# Patient Record
Sex: MALE | Race: BLACK OR AFRICAN AMERICAN | Employment: OTHER | URBAN - METROPOLITAN AREA
[De-identification: names, ages, dates, MRNs, and addresses within clinical notes are randomized per-mention and may not be internally consistent; named-entity substitution may affect disease eponyms.]

---

## 2019-05-21 ENCOUNTER — OFFICE VISIT (OUTPATIENT)
Dept: FAMILY MEDICINE CLINIC | Age: 59
End: 2019-05-21

## 2019-05-21 VITALS
RESPIRATION RATE: 16 BRPM | DIASTOLIC BLOOD PRESSURE: 64 MMHG | OXYGEN SATURATION: 99 % | WEIGHT: 187.2 LBS | BODY MASS INDEX: 26.8 KG/M2 | HEIGHT: 70 IN | HEART RATE: 46 BPM | SYSTOLIC BLOOD PRESSURE: 140 MMHG | TEMPERATURE: 98.3 F

## 2019-05-21 DIAGNOSIS — I10 ESSENTIAL HYPERTENSION: ICD-10-CM

## 2019-05-21 DIAGNOSIS — M25.511 ACUTE PAIN OF RIGHT SHOULDER: Primary | ICD-10-CM

## 2019-05-21 DIAGNOSIS — E79.0 HYPERURICEMIA: ICD-10-CM

## 2019-05-21 DIAGNOSIS — R07.9 CHEST PAIN, UNSPECIFIED TYPE: ICD-10-CM

## 2019-05-21 DIAGNOSIS — R00.1 BRADYCARDIA: ICD-10-CM

## 2019-05-21 DIAGNOSIS — M62.9 MUSCULOSKELETAL DISORDER INVOLVING UPPER TRAPEZIUS MUSCLE: ICD-10-CM

## 2019-05-21 RX ORDER — IBUPROFEN 800 MG/1
1600 TABLET ORAL DAILY
COMMUNITY

## 2019-05-21 RX ORDER — PHENOL/SODIUM PHENOLATE
20 AEROSOL, SPRAY (ML) MUCOUS MEMBRANE DAILY
Qty: 30 TAB | Refills: 0 | Status: SHIPPED | OUTPATIENT
Start: 2019-05-21

## 2019-05-21 RX ORDER — AMLODIPINE BESYLATE 10 MG/1
10 TABLET ORAL DAILY
COMMUNITY

## 2019-05-21 NOTE — PROGRESS NOTES
1. Have you been to the ER, urgent care clinic since your last visit? Hospitalized since your last visit? No    2. Have you seen or consulted any other health care providers outside of the [address] [place] [address] since your last visit? Include any pap smears or colon screening.  No      Chief Complaint   Patient presents with    New Patient    Chest Pain     x 3 weeks    Back Pain     x 2-3 years     Shoulder Pain     right shoulder pain x 3 weeks and getting worse

## 2019-05-21 NOTE — PROGRESS NOTES
Patient: Adela Kaufman MRN: [id number]  SSN: [id number]    YOB: 1960  Age: 62 y.o. Sex: male      Date of Service: 5/21/2019   Provider: TIMOTHY Johnson   Office Location:   [address] [place] [address] Dr Theo lariosgas, [address]  Office Phone: [phone]  Office Fax: [phone]        REASON FOR VISIT:   Chief Complaint   Patient presents with   [address] [place] [address] Patient    Chest Pain     x 3 weeks    Back Pain     x 2-3 years     Shoulder Pain     right shoulder pain x 3 weeks and getting worse        VITALS:   Visit Vitals  /64 (BP 1 Location: Left arm, BP Patient Position: Sitting)   Pulse (!) 46   Temp 98.3 °F (36.8 °C) (Oral)   Resp 16   Ht 5' 10\" (1.778 m)   Wt 187 lb 3.2 oz (84.9 kg)   SpO2 99%   BMI 26.86 kg/m²       MEDICATIONS:   Current Outpatient Medications   Medication Sig Dispense Refill    ibuprofen (MOTRIN) 800 mg tablet Take 1,600 mg by mouth daily.  amLODIPine (NORVASC) 10 mg tablet Take 10 mg by mouth daily. ALLERGIES:   No Known Allergies     MEDICAL/SURGICAL HISTORY:  Past Medical History:   Diagnosis Date    Gout     Hypertension       History reviewed. No pertinent surgical history. FAMILY HISTORY:  Family History   Problem Relation Age of Onset    Diabetes Father     Diabetes Sister     Hypertension Maternal Uncle         SOCIAL HISTORY:  Social History     Tobacco Use    Smoking status: Not on file   Substance Use Topics    Alcohol use: Not on file            HISTORY OF PRESENT ILLNESS:   Adela Kaufman is a 62 y.o. male who presents to the office for acute care. He is accompanied by his son, who assists in translating certain phrases. Patient speaks [place] but primarily speaks [place]. Patient is from [facility] and is visiting family here for the summer. He does have a primary care doctor in his home country that he follows with regularly.  Other than a history of hypertension, generally well controlled on amlodipine 10 mg daily, patient states he has been in good health. He does, however, have a few concerns that he would like to address today. Shoulder Pain -  Patient's primary concern is pain in his R posterior shoulder x 2-3 weeks. Pain is aggravated by movement of his neck, but also by eating. Patient states that frequently after eating he will feel pain radiating up through the center of his chest and into his R shoulder. He attributes the pain to playing tennis (he is right handed) but denies any specific inciting injury. He has tried various OTC pain relievers as well as ice and heat without relief. His son accompanies him and states he had the exact same problem recently, and only got relief from dry needling. Pain does not radiate down his arm and is not associated with any numbness, tingling, or weakness. He is requesting a PT referral to try dry needling. Chest Pain -   Patient also complains of intermittent chest pain. Initially, he states that symptoms have been ongoing for years, and that the pain feels muscular. He attributes it to years of driving. However, in the past few weeks, he has been experiencing a new/different discomfort in his chest, as mentioned above. This typically occurs shortly after eating, and the pain radiates into his R shoulder. Patient denies any associated SOB, diaphoresis, nausea, or lightheadedness. The chest pain is not associated with exertion. He states he had a full cardiac workup in [facility] about 3 years ago and was told everything was fine. He is noted to be bradycardic today, and is not certain whether or not this is his baseline. Finally, patient is requesting to have some routine labs done. He would like to have his cholesterol checked. He also mentions that his doctor in [facility] did a blood test that told him he is at risk for gout, but that he has never actually had gout.  He wants to re-check this as he has been following what sounds like a low purine diet.     REVIEW OF SYSTEMS:  ROS (see scanned H&P form for complete 12 point ROS)     PHYSICAL EXAMINATION:  Physical Exam   Constitutional: He is oriented to person, place, and time and well-developed, well-nourished, and in no distress. HENT:   Head: Normocephalic and atraumatic. Right Ear: External ear normal.   Left Ear: External ear normal.   Nose: Nose normal.   Mouth/Throat: Oropharynx is clear and moist.   Neck: Neck supple. Cardiovascular: Normal rate, regular rhythm and normal heart sounds. Exam reveals no gallop and no friction rub. No murmur heard. Pulmonary/Chest: Effort normal and breath sounds normal. No stridor. No respiratory distress. He has no wheezes. He has no rales. He exhibits no tenderness. Musculoskeletal:        Right shoulder: He exhibits pain and spasm ( posterior shoulder and upper trapezius). He exhibits normal range of motion, no bony tenderness, normal pulse and normal strength. Cervical back: He exhibits decreased range of motion ( neck/shoulder pain reproducible with flexion, rotation and lateral flexion). He exhibits no bony tenderness, no edema and no deformity. Arms:  Lymphadenopathy:     He has no cervical adenopathy. Neurological: He is alert and oriented to person, place, and time. Gait normal.   Skin: Skin is warm and dry. No rash noted. He is not diaphoretic. Psychiatric: Mood, memory and affect normal.        RESULTS:  No results found for this visit on 05/21/19. ASSESSMENT/PLAN:  Diagnoses and all orders for this visit:    1. Acute pain of right shoulder  2. Musculoskeletal disorder involving upper trapezius muscle  - Patient does seem to have some significant muscle spasm in his R upper trapezius distribution, and I will order PT referral at his request for this. The decision to try dry needling will have to be at the discretion of the therapist.  Orders:    -     Omeprazole delayed release (PRILOSEC D/R) 20 mg tablet;  Take 1 Tab by mouth daily. for acid reflux  -     REFERRAL TO PHYSICAL THERAPY    3. Chest pain, unspecified type  4. Essential hypertension  5. Bradycardia  - I suspect possible GERD based on patient's description of symptoms and association with eating, however given his age and risk factors, need to r/o underlying cardiac issue  - will start by obtaining EKG, CXR, and basic labs   - trial of PPI in the meantime  - To ER if symptoms persist, worsen, or become exertional in nature. This was discussed at length with patient and his son   Orders:    -     XR CHEST PA LAT; Future  -     EKG, 12 LEAD, INITIAL; Future  -     CBC WITH AUTOMATED DIFF; Future  -     METABOLIC PANEL, BASIC; Future  -     TSH 3RD GENERATION; Future  -     Omeprazole delayed release (PRILOSEC D/R) 20 mg tablet; Take 1 Tab by mouth daily. for acid reflux    6. Hyperuricemia  - check uric acid level at patient's request  Orders:    -     URIC ACID; Future      Follow-up and Dispositions    · Return for follow-up in 1-2 weeks (30 min).             PATIENT CARE TEAM:   No care team member to display       TIMOTHY Mistry   May 21, 2019   1:15 PM

## 2019-05-22 ENCOUNTER — HOSPITAL ENCOUNTER (OUTPATIENT)
Dept: LAB | Age: 59
Discharge: HOME | End: 2019-05-22
Payer: SUBSIDIZED

## 2019-05-22 ENCOUNTER — HOSPITAL ENCOUNTER (OUTPATIENT)
Dept: PHYSICAL THERAPY | Age: 59
Discharge: HOME | End: 2019-05-22
Payer: SUBSIDIZED

## 2019-05-22 ENCOUNTER — HOSPITAL ENCOUNTER (OUTPATIENT)
Dept: GENERAL RADIOLOGY | Age: 59
Discharge: HOME | End: 2019-05-22
Payer: SUBSIDIZED

## 2019-05-22 DIAGNOSIS — R00.1 BRADYCARDIA: ICD-10-CM

## 2019-05-22 DIAGNOSIS — I10 ESSENTIAL HYPERTENSION: ICD-10-CM

## 2019-05-22 DIAGNOSIS — R07.9 CHEST PAIN, UNSPECIFIED TYPE: ICD-10-CM

## 2019-05-22 DIAGNOSIS — E79.0 HYPERURICEMIA: ICD-10-CM

## 2019-05-22 LAB
ANION GAP SERPL CALC-SCNC: 4 MMOL/L (ref 3–18)
ATRIAL RATE: 43 BPM
BASOPHILS # BLD: 0 K/UL (ref 0–0.1)
BASOPHILS NFR BLD: 0 % (ref 0–2)
BUN SERPL-MCNC: 11 MG/DL (ref 7–18)
BUN/CREAT SERPL: 9 (ref 12–20)
CALCIUM SERPL-MCNC: 9.2 MG/DL (ref 8.5–10.1)
CALCULATED P AXIS, ECG09: 63 DEGREES
CALCULATED R AXIS, ECG10: 18 DEGREES
CALCULATED T AXIS, ECG11: 30 DEGREES
CHLORIDE SERPL-SCNC: 109 MMOL/L (ref 100–108)
CHOLEST SERPL-MCNC: 225 MG/DL
CO2 SERPL-SCNC: 29 MMOL/L (ref 21–32)
CREAT SERPL-MCNC: 1.17 MG/DL (ref 0.6–1.3)
DIAGNOSIS, 93000: NORMAL
DIFFERENTIAL METHOD BLD: ABNORMAL
EOSINOPHIL # BLD: 0 K/UL (ref 0–0.4)
EOSINOPHIL NFR BLD: 1 % (ref 0–5)
ERYTHROCYTE [DISTWIDTH] IN BLOOD BY AUTOMATED COUNT: 13.6 % (ref 11.6–14.5)
GLUCOSE SERPL-MCNC: 80 MG/DL (ref 74–99)
HCT VFR BLD AUTO: 48.7 % (ref 36–48)
HDLC SERPL-MCNC: 71 MG/DL (ref 40–60)
HDLC SERPL: 3.2 {RATIO} (ref 0–5)
HGB BLD-MCNC: 16.3 G/DL (ref 13–16)
LDLC SERPL CALC-MCNC: 138 MG/DL (ref 0–100)
LIPID PROFILE,FLP: ABNORMAL
LYMPHOCYTES # BLD: 2.8 K/UL (ref 0.9–3.6)
LYMPHOCYTES NFR BLD: 40 % (ref 21–52)
MCH RBC QN AUTO: 30.5 PG (ref 24–34)
MCHC RBC AUTO-ENTMCNC: 33.5 G/DL (ref 31–37)
MCV RBC AUTO: 91 FL (ref 74–97)
MONOCYTES # BLD: 0.5 K/UL (ref 0.05–1.2)
MONOCYTES NFR BLD: 7 % (ref 3–10)
NEUTS SEG # BLD: 3.6 K/UL (ref 1.8–8)
NEUTS SEG NFR BLD: 52 % (ref 40–73)
P-R INTERVAL, ECG05: 196 MS
PLATELET # BLD AUTO: 192 K/UL (ref 135–420)
PMV BLD AUTO: 10.8 FL (ref 9.2–11.8)
POTASSIUM SERPL-SCNC: 4.9 MMOL/L (ref 3.5–5.5)
Q-T INTERVAL, ECG07: 432 MS
QRS DURATION, ECG06: 66 MS
QTC CALCULATION (BEZET), ECG08: 365 MS
RBC # BLD AUTO: 5.35 M/UL (ref 4.7–5.5)
SODIUM SERPL-SCNC: 142 MMOL/L (ref 136–145)
TRIGL SERPL-MCNC: 80 MG/DL (ref ?–150)
TSH SERPL DL<=0.05 MIU/L-ACNC: 1.28 UIU/ML (ref 0.36–3.74)
URATE SERPL-MCNC: 6.4 MG/DL (ref 2.6–7.2)
VENTRICULAR RATE, ECG03: 43 BPM
VLDLC SERPL CALC-MCNC: 16 MG/DL
WBC # BLD AUTO: 6.9 K/UL (ref 4.6–13.2)

## 2019-05-22 PROCEDURE — 84550 ASSAY OF BLOOD/URIC ACID: CPT

## 2019-05-22 PROCEDURE — 80048 BASIC METABOLIC PNL TOTAL CA: CPT

## 2019-05-22 PROCEDURE — 80061 LIPID PANEL: CPT

## 2019-05-22 PROCEDURE — 85025 COMPLETE CBC W/AUTO DIFF WBC: CPT

## 2019-05-22 PROCEDURE — 84443 ASSAY THYROID STIM HORMONE: CPT

## 2019-05-22 PROCEDURE — 71046 X-RAY EXAM CHEST 2 VIEWS: CPT

## 2019-05-22 PROCEDURE — 97162 PT EVAL MOD COMPLEX 30 MIN: CPT

## 2019-05-22 PROCEDURE — 97140 MANUAL THERAPY 1/> REGIONS: CPT

## 2019-05-22 PROCEDURE — 97110 THERAPEUTIC EXERCISES: CPT

## 2019-05-22 PROCEDURE — 36415 COLL VENOUS BLD VENIPUNCTURE: CPT

## 2019-05-22 PROCEDURE — 93005 ELECTROCARDIOGRAM TRACING: CPT

## 2019-05-22 NOTE — PROGRESS NOTES
PT DAILY TREATMENT NOTE 10-18    Patient Name: Albert Walden  Date:2019  : 1960  [x]  Patient  Verified  Payor: SELF PAY / Plan: [employer] SELF PAY / Product Type: Self Pay /    In time:3:02  Out time:3:50  Total Treatment Time (min): 48  Visit #: 1 of 8      Treatment Area: Pain in right shoulder [M25.511]    SUBJECTIVE  Pain Level (0-10 scale): 8  Any medication changes, allergies to medications, adverse drug reactions, diagnosis change, or new procedure performed?: [x] No    [] Yes (see summary sheet for update)  Subjective functional status/changes:   [] No changes reported   Patient is a 62 y.o male presenting with a chief complaint of right shoulder pain that began 3 weeks ago with an insidious onset. Patients son present entire session for translation. Patient reports his pain is sharp in nature and primarily located in the posterior shoulder with occasional radiating symptoms down the RUE. Patient reports he has pain with all activities and nothing makes it better. Patient reports he has tried heat, ice, pain meds, and exercises and nothing has helped thus far. OBJECTIVE      20 min [x]Eval                  []Re-Eval       20 min Therapeutic Exercise:  [x] See flow sheet :   Rationale: increase ROM and increase strength to improve the patients ability to perform ADLs with improved ease      8 min Manual Therapy:  STM right UT/LS, Right shoulder PROM   Rationale: decrease pain, increase ROM and increase tissue extensibility to improve patients functional mobility.                With   [] TE   [] TA   [] neuro   [] other: Patient Education: [x] Review HEP    [] Progressed/Changed HEP based on:   [] positioning   [] body mechanics   [] transfers   [] heat/ice application    [] other:      Other Objective/Functional Measures: See IE     Pain Level (0-10 scale) post treatment: 8    ASSESSMENT/Changes in Function: Patient presents with decreased right shoulder AROM, increased pain with resisted IR and shoulder flexion, myofascial tightness in right UT, (+) hines-sonia, speeds test and lift off test. Patient will benefit from skilled physical therapy in order to address the above impairments. Patient will continue to benefit from skilled PT services to modify and progress therapeutic interventions, address functional mobility deficits, address ROM deficits, address strength deficits, analyze and address soft tissue restrictions, analyze and cue movement patterns, analyze and modify body mechanics/ergonomics and assess and modify postural abnormalities to attain remaining goals. [x]  See Plan of Care  []  See progress note/recertification  []  See Discharge Summary         Progress towards goals / Updated goals:  Short Term Goals: To be accomplished in 2 weeks:               1. Patient will be independent with HEP in order to improve activity tolerance. Long Term Goals: To be accomplished in 4 weeks:               1. Patient will be able to improve FOTO score to 66 in order to demonstrate improvements in functional independence. 2. Patient will be able to improve right shoulder flexion to 165 in order to improve ease of ADLs. 3. Patient will be able to improve right shoulder strength to 4+/5 in order to improve ease of ADLs.      PLAN  []  Upgrade activities as tolerated     [x]  Continue plan of care  []  Update interventions per flow sheet       []  Discharge due to:_  []  Other:_      Bess Alva, PT 5/22/2019  3:52 PM    Future Appointments   Date Time Provider Chantelle Jaramillo   5/29/2019  2:15 PM TIMOTHY Roblero

## 2019-05-22 NOTE — PROGRESS NOTES
[facility]  [address]  [place], [address]  [phone] fax    Plan of Care/ Statement of Necessity for Physical Therapy Services    Patient name: David Arrington Start of Care: 2019   Referral source: TIMOTHY Manning : 1960    Medical Diagnosis: Pain in right shoulder [M25.511]  Payor: SELF PAY / Plan: [facility] SELF PAY / Product Type: Self Pay /  Onset Date:3 weeks ago    Treatment Diagnosis: Right shoulder pain   Prior Hospitalization: see medical history Provider#: [id number]   Medications: Verified on Patient summary List    Comorbidities: none reported    Prior Level of Function: (I) with all ADLs and hobbies. Patient lives in [facility], is only here staying with his son for a short period of time. The Plan of Care and following information is based on the information from the initial evaluation. Assessment/ key information: Patient is a 62 y.o male presenting with a chief complaint of right shoulder pain that began 3 weeks ago with an insidious onset. Patients son present entire session for translation. Patient reports his pain is sharp in nature and primarily located in the posterior shoulder with occasional radiating symptoms down the RUE. Patient reports he has pain with all activities and nothing makes it better. Patient reports he has tried heat, ice, pain meds, and exercises and nothing has helped thus far. Patient presents with decreased right shoulder AROM, increased pain with resisted IR and shoulder flexion, myofascial tightness in right UT, (+) hines-sonia, speeds test and lift off test. Patient will benefit from skilled physical therapy in order to address the above impairments.      Evaluation Complexity History MEDIUM  Complexity : 1-2 comorbidities / personal factors will impact the outcome/ POC ; Examination MEDIUM Complexity : 3 Standardized tests and measures addressing body structure, function, activity limitation and / or participation in recreation  ;Presentation MEDIUM Complexity : Evolving with changing characteristics  ; Clinical Decision Making MEDIUM Complexity : FOTO score of 26-74  Overall Complexity Rating: MEDIUM  Problem List: pain affecting function, decrease ROM, decrease strength, decrease ADL/ functional abilitiies, decrease activity tolerance and decrease flexibility/ joint mobility   Treatment Plan may include any combination of the following: Therapeutic exercise, Neuromuscular re-education, Physical agent/modality, Manual therapy, Patient education and Functional mobility training  Patient / Family readiness to learn indicated by: asking questions, trying to perform skills and interest  Persons(s) to be included in education: patient (P)  Barriers to Learning/Limitations: Language barrier  Patient Goal (s): Stop the pain  Patient Self Reported Health Status: fair  Rehabilitation Potential: fair    Short Term Goals: To be accomplished in 2 weeks:   1. Patient will be independent with HEP in order to improve activity tolerance. Long Term Goals: To be accomplished in 4 weeks:   1. Patient will be able to improve FOTO score to 66 in order to demonstrate improvements in functional independence. 2. Patient will be able to improve right shoulder flexion to 165 in order to improve ease of ADLs. 3. Patient will be able to improve right shoulder strength to 4+/5 in order to improve ease of ADLs. Frequency / Duration: Patient to be seen 2 times per week for 4 weeks. Patient/ Caregiver education and instruction: Diagnosis, prognosis, activity modification and exercises   [x]  Plan of care has been reviewed with ZOHAIB Hooker, PT 5/22/2019 3:51 PM    ________________________________________________________________________    I certify that the above Therapy Services are being furnished while the patient is under my care.  I agree with the treatment plan and certify that this therapy is necessary.     Physician's Signature:____________Date:_________TIME:________    ** Signature, Date and Time must be completed for valid certification **    Please sign and return to [facility] [address]  [address]  [place], [address]  [phone] fax

## 2019-05-23 ENCOUNTER — HOSPITAL ENCOUNTER (OUTPATIENT)
Dept: PHYSICAL THERAPY | Age: 59
Discharge: HOME | End: 2019-05-23
Payer: SUBSIDIZED

## 2019-05-23 PROCEDURE — 97112 NEUROMUSCULAR REEDUCATION: CPT

## 2019-05-23 PROCEDURE — 97110 THERAPEUTIC EXERCISES: CPT

## 2019-05-23 NOTE — PROGRESS NOTES
Please advise patient/son that his labs, EKG, and chest x-ray looked pretty good overall. His heart rate is still very low, but there were no blocks or arrhythmias seen on the EKG. We can go over everything in greater detail at his f/u next week. Please make sure I have 30 min due to language barrier.

## 2019-05-23 NOTE — PROGRESS NOTES
PT DAILY TREATMENT NOTE     Patient Name: Albert Walden  Date:2019  : 1960  [x]  Patient  Verified  Payor: SELF PAY / Plan: [employer] SELF PAY / Product Type: Self Pay /    In time:4:30  Out time:5:18  Total Treatment Time (min): 48  Visit #: 2 of 8    Treatment Area: Pain in right shoulder [M25.511]    SUBJECTIVE  Pain Level (0-10 scale): 8  Any medication changes, allergies to medications, adverse drug reactions, diagnosis change, or new procedure performed?: [x] No    [] Yes (see summary sheet for update)  Subjective functional status/changes:   [] No changes reported  Pt reports resting proximal shoulder pain that also hurts with active motion    OBJECTIVE    Modality rationale: PD to improve the patients ability to    Min Type Additional Details    [] Estim:  []Unatt       []IFC  []Premod                        []Other:  []w/ice   []w/heat  Position:  Location:    [] Estim: []Att    []TENS instruct  []NMES                    []Other:  []w/US   []w/ice   []w/heat  Position:  Location:    []  Traction: [] Cervical       []Lumbar                       [] Prone          []Supine                       []Intermittent   []Continuous Lbs:  [] before manual  [] after manual    []  Ultrasound: []Continuous   [] Pulsed                           []1MHz   []3MHz W/cm2:  Location:    []  Iontophoresis with dexamethasone         Location: [] Take home patch   [] In clinic    []  Ice     []  heat  []  Ice massage  []  Laser   []  Anodyne Position:  Location:    []  Laser with stim  []  Other:  Position:  Location:    []  Vasopneumatic Device Pressure:       [] lo [] med [] hi   Temperature: [] lo [] med [] hi   [] Skin assessment post-treatment:  []intact []redness- no adverse reaction    []redness  adverse reaction:       33 min Therapeutic Exercise:  [x] See flow sheet : including DN discussion and education with son and patient   Rationale: increase ROM and increase strength to improve the patients ability to perform daily tasks      15 min Neuromuscular Re-education:  []  See flow sheet : DN palpation and setup   Rationale: increase ROM and improve coordination  to improve the patients ability to perform functional tasks with improved scapulohumeral mechanics    Dry Needling Procedure Note    Procedure: An intramuscular manual therapy (dry needling) and a neuro-muscular re-education treatment was done to deactivate myofascial trigger points with a 30 gauge filament needle under aseptic technique. Indications:  [x] Myofascial pain and dysfunction [] Muscled spasms  [] Myalgia/myositis   [] Muscle cramps  [x] Muscle imbalances  [] Temporomandibular Dysfunction  [] Other:    Chart reviewed for the following:  Malcolm BLUE, have reviewed the medical history, summary list and precautions/contraindications for [employer].   TIME OUT performed immediately prior to start of procedure:  Malcolm BLUE, have performed the following reviews on [employer] prior to the start of the session:      [x] Verified patient identification by name and date of birth    [x] Agreement on all muscles being treated was verified   [x] Purpose of dry needling, side effects, possible complications, risks and benefits were explained to the patient   [x] Procedure site(s) verified  [x] Patient was positioned for comfort and draped for privacy  [x] Informed Consent was signed (initial visit) and verified verbally (subsequent visits)  [x] Patient was instructed on the need to report the use of blood thinners and/or immunosuppressant medications  [x] How to respond to possible adverse effects of treatment  [x] Self treatment of post needling soreness: ice, heat (moist heat, heat wraps) and stretching  [x] Opportunity was given to ask any questions, all questions were answered            Time: 4:50  Date of procedure: 5/23/2019  Treatment: The following muscles were treated today with intramuscular dry needling  [] Left [] Right Masster  [] Left [] Right Temporalis  [] Left [] Right Zygomaticus Major / Minor  [] Left [] Right Lateral Pterygoid  [] Left [] Right Medial Pterygoid  [] Left [] Right Digastric Post / Anterior Belly  [] Left [] Right Sternocleidomastoid  [] Left [] Right Scalene Anterior / Medial / Posterior  [] Left [] Right Extra Laryngeal Muscles  [] Left [] Right Upper Trapezius  [] Left [x] Right Middle Trapezius  [] Left [] Right Lower Trapezius  [] Left [] Right Oblique Capitis Inferior  [] Left [] Right Splenius Capitis / Cervicis  [] Left [] Right Semispinalis: Capitis / Cervicis  [] Left [] Right Multifidi / Rotatores Cervicis / Thoracic  [] Left [] Right Longissimus Thoracis / Illiocostalis  [] Left [] Right Levator Scapulae  [] Left [] Right Supraspinatus / Infraspinatus  [] Left [] Right Teres Major / Minor  [] Left [x] Right Rhomboids / Serratus posterior superior  [] Left [] Right Pectoralis Major / Minor  [] Left [] Right Serratus Anterior  [] Left [] Right Latissimus Dorsi  [] Left [] Right Subscapularis  [] Left [] Right Coracobrachialis  [] Left [] Right Biceps Brachii  [] Left [] Right Deltoid: Anterior / Medial / Posterior  [] Left [] Right Brachialis  [] Left [] Right Triceps  [] Left [] Right Brachioradialis  [] Left [] Right Extensor Carpi Radialis Brevis / Extensor Carpi Radialis Longus    [] Left [] Right  Extensor digitorum  [] Left [] Right Supinator / Pronator Teres  [] Left [] Right Flexor Carpi Radialis/ Flexor Carpi Ulnaris   [] Left [] Right  Flexor Digitorum Superficialis/ Flexor Digitorum Profundus  [] Left [] Right Flexor Pollicis Longus / Flexor Pollicis Brevis / Palmaris Longus  [] Left [] Right Abductor Pollicis Longus / Abductor Pollicis Brevis  [] Left [] Right Opponens Pollicis / Adductor Pollicis  [] Left [] Right Dorsal / Palmar Interossei / Lumbricalis  [] Left [] Right Abductor Digiti Minimi / Opponens Digiti Minimi    Patient's response to today's treatment:  [x] Latent Twitch Response     [] Muscle relaxation [] Pain Relief  [x] Post needling soreness    [x] without complications  [] Increased Range of Motion   [] Decreased headaches    [] Decreased Tinnitus  [] Other:     Performed by: Zully Gutierrez DPT, SIERRAPT              With   [] TE   [] TA   [] neuro   [] other: Patient Education: [x] Review HEP    [] Progressed/Changed HEP based on:   [] positioning   [] body mechanics   [] transfers   [] heat/ice application    [] other:      Other Objective/Functional Measures: pt refuses to perform therex with technician stating that he will receive DN or leave. Educated pt that DN will not be performed if pt refuses to perform exercises afterwards. Pt and son both agree that he will perform post needling exercises in clinic to allow DN participation     Questionable (+) Spurling's to right with referral to medial scap border; pt also demonstrates painful cervical SB and rotation to right in UT/proximal scapula region    Pain Level (0-10 scale) post treatment: 6    ASSESSMENT/Changes in Function: Pt demonstrates more hypermobile right shoulder than tight at this time. He does demonstrate some scapular winging with proximal dominance through UT/LS. TTP noted through levator scap and infraspinatus referring to shoulder. Advised pt to monitor symptoms with cervical motions and symptom progression post needling to allow proper modification of treatment. Patient will continue to benefit from skilled PT services to modify and progress therapeutic interventions, address functional mobility deficits, address ROM deficits, address strength deficits, analyze and address soft tissue restrictions, analyze and cue movement patterns and analyze and modify body mechanics/ergonomics to attain remaining goals. []  See Plan of Care  []  See progress note/recertification  []  See Discharge Summary         Progress towards goals / Updated goals:  Short Term Goals: To be accomplished in 2 weeks:               1.  Patient will be independent with HEP in order to improve activity tolerance.  evaluation yesterday re-assess next week 5/23/19  Long Term Goals: To be accomplished in 4 weeks:               1. Patient will be able to improve FOTO score to 66 in order to demonstrate improvements in functional independence.                2. Patient will be able to improve right shoulder flexion to 165 in order to improve ease of ADLs.                 3. Patient will be able to improve right shoulder strength to 4+/5 in order to improve ease of ADLs.      PLAN  []  Upgrade activities as tolerated     []  Continue plan of care  []  Update interventions per flow sheet       []  Discharge due to:_  []  Other:_      Landon Villa DPT, CMTPT 5/23/2019  5:26 PM    Future Appointments   Date Time Provider Chantelle Jaramillo   5/29/2019  8:30 AM Elmer Angella[address] [facility]   5/29/2019  2:15 PM TIMOTHY Sumner [facility] SCH[facility]   6/4/2019 11:30 [facility]   6/17/2019 11:30 AM [facility]

## 2019-05-29 ENCOUNTER — HOSPITAL ENCOUNTER (OUTPATIENT)
Dept: PHYSICAL THERAPY | Age: 59
Discharge: HOME | End: 2019-05-29
Payer: SUBSIDIZED

## 2019-05-29 ENCOUNTER — OFFICE VISIT (OUTPATIENT)
Dept: FAMILY MEDICINE CLINIC | Age: 59
End: 2019-05-29

## 2019-05-29 VITALS
WEIGHT: 189 LBS | DIASTOLIC BLOOD PRESSURE: 84 MMHG | SYSTOLIC BLOOD PRESSURE: 128 MMHG | RESPIRATION RATE: 16 BRPM | OXYGEN SATURATION: 98 % | BODY MASS INDEX: 27.06 KG/M2 | HEIGHT: 70 IN | TEMPERATURE: 98 F | HEART RATE: 55 BPM

## 2019-05-29 DIAGNOSIS — M25.511 ACUTE PAIN OF RIGHT SHOULDER: Primary | ICD-10-CM

## 2019-05-29 DIAGNOSIS — R25.1 TREMOR: ICD-10-CM

## 2019-05-29 DIAGNOSIS — R00.1 BRADYCARDIA: ICD-10-CM

## 2019-05-29 DIAGNOSIS — K21.9 GASTROESOPHAGEAL REFLUX DISEASE WITHOUT ESOPHAGITIS: ICD-10-CM

## 2019-05-29 DIAGNOSIS — M62.9 MUSCULOSKELETAL DISORDER INVOLVING UPPER TRAPEZIUS MUSCLE: ICD-10-CM

## 2019-05-29 PROCEDURE — 97110 THERAPEUTIC EXERCISES: CPT

## 2019-05-29 PROCEDURE — 97112 NEUROMUSCULAR REEDUCATION: CPT

## 2019-05-29 PROCEDURE — 97140 MANUAL THERAPY 1/> REGIONS: CPT

## 2019-05-29 NOTE — PROGRESS NOTES
1. Have you been to the ER, urgent care clinic since your last visit? Hospitalized since your last visit? No    2. Have you seen or consulted any other health care providers outside of the [address] [place] [address] since your last visit? Include any pap smears or colon screening.  No

## 2019-05-29 NOTE — PROGRESS NOTES
Patient: Erick Anderson MRN: [id number]  SSN: [id number]    YOB: 1960  Age: 62 y.o. Sex: male      Date of Service: 5/29/2019   Provider: Randye Merlin, PA   Office Location:   [address] [place] [address] [address], [address]  Office Phone: [phone]  Office Fax: [phone]      REASON FOR VISIT:   Chief Complaint   Patient presents with    GERD    Shoulder Pain    Results     review of results     Cough     ongoing past 1 month         VITALS:   Visit Vitals  /84 (BP 1 Location: Left arm, BP Patient Position: Sitting)   Pulse (!) 55   Temp 98 °F (36.7 °C) (Oral)   Resp 16   Ht 5' 10\" (1.778 m)   Wt 189 lb (85.7 kg)   SpO2 98%   BMI 27.12 kg/m²        MEDICATIONS:   Current Outpatient Medications on File Prior to Visit   Medication Sig Dispense Refill    benzocaine-menthol (CEPACOL SORE THROAT, DANY-MEN,) 15-2.6 mg lozg lozenge Take 1 Lozenge by mouth every two (2) hours as needed for Sore throat.  guaiFENesin (MUCINEX) 1,200 mg Ta12 ER tablet Take 1,200 mg by mouth two (2) times a day.  amLODIPine (NORVASC) 10 mg tablet Take 10 mg by mouth daily.  Omeprazole delayed release (PRILOSEC D/R) 20 mg tablet Take 1 Tab by mouth daily. for acid reflux 30 Tab 0    ibuprofen (MOTRIN) 800 mg tablet Take 1,600 mg by mouth daily. No current facility-administered medications on file prior to visit. ALLERGIES:   No Known Allergies     MEDICAL/SURGICAL HISTORY:  Past Medical History:   Diagnosis Date    Gout     Hypertension       History reviewed. No pertinent surgical history.      FAMILY HISTORY:  Family History   Problem Relation Age of Onset    Diabetes Father     Diabetes Sister     Hypertension Maternal Uncle         SOCIAL HISTORY:  Social History     Tobacco Use    Smoking status: Never Smoker    Smokeless tobacco: Never Used   Substance Use Topics    Alcohol use: Never     Frequency: Never    Drug use: Not on file             HISTORY OF PRESENT ILLNESS: Adela Kaufman is a 62 y.o. male who presents to the office for a 1 week follow up visit. Patient is from [facility] and is visiting family here for the summer. He does have a primary care doctor in his home country that he follows with regularly. He was seen here last week with a variety of acute complaints and is here today to follow up on some test results. Chest/Shoulder Pain -  Patient's primary concern was pain in his R posterior shoulder, ongoing x a few weeks. Pain is aggravated by movement of his neck, but also by eating. Patient stated that frequently after eating he would feel pain radiating up through the center of his chest and into his R shoulder. He attributes the pain to playing tennis (he is right handed) but denies any specific inciting injury. He had tried various OTC pain relievers as well as ice and heat without relief. He was referred to PT for his shoulder and neck and reports he is already feeling better. With regards to the symptoms a/w eating, he was started on a trial of Prilosec 20 mg and states that seems to be helping as well. However, given presence of chest pain and marked bradycardia on initial exam, a workup including basic labs, EKG, and CXR was done. He had also requested a lipid panel and uric acid level to be checked. Here today to go over the results. Tremor -   Acutely today, patient complains of an intermittent intention tremor in his R hand. This has been going on for quite a while now, and he wonders if it might be related to his shoulder issue. Symptoms are more pronounced when he is writing or eating, though he is unable to reproduce the symptoms today. Patient states he has mentioned this to his doctor in [facility] and was told to stop drinking coffee. REVIEW OF SYSTEMS:  Review of Systems   Constitutional: Negative for chills, fever and malaise/fatigue.    Respiratory: Negative for cough, shortness of breath and wheezing. Cardiovascular: Negative for chest pain, palpitations and leg swelling. Gastrointestinal: Negative for abdominal pain, heartburn, nausea and vomiting. Musculoskeletal: Positive for joint pain ( R shoulder pain - improving) and neck pain ( improving). PHYSICAL EXAMINATION:  Physical Exam   Constitutional: He is oriented to person, place, and time and well-developed, well-nourished, and in no distress. Cardiovascular: Regular rhythm and normal heart sounds. Bradycardia present. Exam reveals no gallop and no friction rub. No murmur heard. Pulmonary/Chest: Effort normal and breath sounds normal. He has no wheezes. He has no rales. Neurological: He is alert and oriented to person, place, and time. He displays no tremor. Gait normal.   Skin: Skin is warm and dry. No rash noted.    Psychiatric: Mood, memory and affect normal.        RESULTS:  Lab Results   Component Value Date/Time    WBC 6.9 05/22/2019 10:34 AM    HGB 16.3 (H) 05/22/2019 10:34 AM    HCT 48.7 (H) 05/22/2019 10:34 AM    PLATELET 778 00/14/2066 10:34 AM    MCV 91.0 05/22/2019 10:34 AM      Lab Results   Component Value Date/Time    Sodium 142 05/22/2019 10:34 AM    Potassium 4.9 05/22/2019 10:34 AM    Chloride 109 (H) 05/22/2019 10:34 AM    CO2 29 05/22/2019 10:34 AM    Anion gap 4 05/22/2019 10:34 AM    Glucose 80 05/22/2019 10:34 AM    BUN 11 05/22/2019 10:34 AM    Creatinine 1.17 05/22/2019 10:34 AM    BUN/Creatinine ratio 9 (L) 05/22/2019 10:34 AM    GFR est AA >60 05/22/2019 10:34 AM    GFR est non-AA >60 05/22/2019 10:34 AM    Calcium 9.2 05/22/2019 10:34 AM      Lab Results   Component Value Date/Time    Cholesterol, total 225 (H) 05/22/2019 10:34 AM    HDL Cholesterol 71 (H) 05/22/2019 10:34 AM    LDL, calculated 138 (H) 05/22/2019 10:34 AM    VLDL, calculated 16 05/22/2019 10:34 AM    Triglyceride 80 05/22/2019 10:34 AM    CHOL/HDL Ratio 3.2 05/22/2019 10:34 AM      Lab Results   Component Value Date/Time Uric acid 6.4 05/22/2019 10:34 AM      Lab Results   Component Value Date/Time    TSH 1.28 05/22/2019 10:34 AM      Results from Hospital Encounter encounter on 05/22/19   XR CHEST PA LAT    Narrative CHEST PA AND LATERAL      HISTORY: Chest pain and shortness of breath. COMPARISONS: None. FINDINGS:     Two views of the chest were obtained on 5/22/2019. The lung fields are clear. There is no evidence of pleural effusions, pneumothorax, or pulmonary vascular  congestion. Cardiac silhouette size is normal.      Mediastinal contours are unremarkable. Impression No acute diagnostic abnormality. Component Value Ref Range & Units Status   Ventricular Rate 43  BPM Final   Atrial Rate 43  BPM Final   P-R Interval 196  ms Final   QRS Duration 66  ms Final   Q-T Interval 432  ms Final   QTC Calculation (Bezet) 365  ms Final   Calculated P Axis 63  degrees Final   Calculated R Axis 18  degrees Final   Calculated T Axis 30  degrees Final   Diagnosis   Final   Marked sinus bradycardia   Abnormal ECG   No previous ECGs available   Confirmed by Cadence Cunningham ([id number]) on 5/22/2019 10:38:02 PM        ASSESSMENT/PLAN:  Diagnoses and all orders for this visit:    1. Acute pain of right shoulder  2. Musculoskeletal disorder involving upper trapezius muscle   -Continue with PT     3. Gastroesophageal reflux disease without esophagitis  - Chest discomfort seems to have resolved with PPI  - Continue omeprazole for now   - Will monitor     4. Bradycardia  - Suggested referral to cardiology for further evaluation, to increase our certainty that his chest discomfort was not related to ischemia, and to further assess his sinus bradycardia. - Patient declines for now as he is awaiting approval for financial assistance to cover medical bills while he is in the [address] [place] [address].  He will let me know if he would like to proceed with a referral.     5. Tremor  - Chronic, mild  - Not a good candidate for beta blockers due to bradycardia  - Offered referral to neuro for further workup, but also advised patient and his son that if they are concerned about costs, it would be best to wait for either his financial assistance to kick in, or simply to discuss with his doctor in [facility] when he returns home. For now, he will follow up with me as needed. Follow-up and Dispositions    · Return if symptoms worsen or fail to improve. All questions answered. Patient expresses understanding and agrees with the plan as detailed above.     PATIENT CARE TEAM:   Patient Care Team:  UNKNOWN as PCP - [id number] [place], [place]   May 30, 2019   7:46 AM

## 2019-05-29 NOTE — PROGRESS NOTES
PT DAILY TREATMENT NOTE 1216    Patient Name: Erick Anderson  Date:2019  : 1960  [x]  Patient  Verified  Payor: SELF PAY / Plan: [employer] SELF PAY / Product Type: Self Pay /    In time:8:34  Out time:9:08  Total Treatment Time (min): 34  Visit #: 3 of 8    Treatment Area: Pain in right shoulder [M25.511]    SUBJECTIVE  Pain Level (0-10 scale): 5  Any medication changes, allergies to medications, adverse drug reactions, diagnosis change, or new procedure performed?: [x] No    [] Yes (see summary sheet for update)  Subjective functional status/changes:   [] No changes reported  \"Better than last time\"    OBJECTIVE      16 min Therapeutic Exercise:  [] See flow sheet :   Rationale: increase ROM and increase strength to improve the patients ability to perform daily tasks and self care     10 min Neuromuscular Re-education:  []  See flow sheet :   Rationale: increase strength, improve coordination and increase proprioception  to improve the patients scapular stability and posterior shoulder strength and reduced cervical strain with ADLs    8 min Manual Therapy:  TPR right levator scap, T/S PA's and rib springs, medial scap border mobs right   Rationale: increase ROM, increase tissue extensibility and decrease trigger points to improve ease of ADLs        With   [] TE   [] TA   [] neuro   [] other: Patient Education: [x] Review HEP    [] Progressed/Changed HEP based on:   [] positioning   [] body mechanics   [] transfers   [] heat/ice application    [] other:      Other Objective/Functional Measures: attempted prone scapular letters, pt reports increased right shoulder discomfort into right hand, decreased when attaining left s/l position    Pt does demonstrate some apparent volitional shaking through right UE with weight wand ER on right compared to left, discussed with patient that this seems to be muscle strength and endurance related     Pain Level (0-10 scale) post treatment: 4-5    ASSESSMENT/Changes in Function: Pt continues to demonstrate [address] hypermobility with limited posterior shoulder stabilization. He does report decreased pain since last session, aside from prone scapular work pt denies any pain increase with today's therex. He declines DN today stating he will wait until next visit. Advised pt to attempt reducing positions into right SB and extension as able as pt still reporting symptoms into right UE with right s/l as well as with right SB. Patient will continue to benefit from skilled PT services to modify and progress therapeutic interventions, address functional mobility deficits, address ROM deficits, address strength deficits, analyze and address soft tissue restrictions, analyze and cue movement patterns and analyze and modify body mechanics/ergonomics to attain remaining goals. []  See Plan of Care  []  See progress note/recertification  []  See Discharge Summary         Progress towards goals / Updated goals:  Short Term Goals: To be accomplished in 2 weeks:               1. Patient will be independent with HEP in order to improve activity tolerance.  evaluation yesterday re-assess next week 5/23/19  Long Term Goals: To be accomplished in 4 weeks:               1. Patient will be able to improve FOTO score to 66 in order to demonstrate improvements in functional independence.                2. Patient will be able to improve right shoulder flexion to 165 in order to improve ease of ADLs. not met 150 deg with reports of sharp pain through right teres region 5/29/19               3. Patient will be able to improve right shoulder strength to 4+/5 in order to improve ease of ADLs.      PLAN  []  Upgrade activities as tolerated     [x]  Continue plan of care  []  Update interventions per flow sheet       []  Discharge due to:_  []  Other:_      Fer Vernon DPT, SIERRAPT 5/29/2019  8:41 AM    Future Appointments   Date Time Provider Chantelle Jaramillo   5/29/2019  2:15 PM TIMOTHY Kern [facility] SCHED   6/4/2019 11:30 AM Nadara Goldberg, PT [facility]   6/17/2019 11:30 [facility]

## 2019-06-04 ENCOUNTER — HOSPITAL ENCOUNTER (OUTPATIENT)
Dept: PHYSICAL THERAPY | Age: 59
Discharge: HOME | End: 2019-06-04
Payer: SUBSIDIZED

## 2019-06-04 PROCEDURE — 97112 NEUROMUSCULAR REEDUCATION: CPT

## 2019-06-04 PROCEDURE — 97110 THERAPEUTIC EXERCISES: CPT

## 2019-06-04 PROCEDURE — 97140 MANUAL THERAPY 1/> REGIONS: CPT

## 2019-06-04 NOTE — PROGRESS NOTES
PT DAILY TREATMENT NOTE     Patient Name: Robert Santos  Date:2019  : 1960  [x]  Patient  Verified  Pay[facility]F PA[facility] / Product Type: Self Pay /    In time:11:29  Out time:12:06  Total Treatment Time (min): 37  Visit #: 4 of 8    Treatment Area: Pain in right shoulder [M25.511]    SUBJECTIVE  Pain Level (0-10 scale): 4  Any medication changes, allergies to medications, adverse drug reactions, diagnosis change, or new procedure performed?: [x] No    [] Yes (see summary sheet for update)  Subjective functional status/changes:   [] No changes reported  Pt reports improved right UE paresthesias at this time, still having some discomfort through medial scap border    OBJECTIVE    Modality rationale: PD to improve the patients ability to    Min Type Additional Details    [] Estim:  []Unatt       []IFC  []Premod                        []Other:  []w/ice   []w/heat  Position:  Location:    [] Estim: []Att    []TENS instruct  []NMES                    []Other:  []w/US   []w/ice   []w/heat  Position:  Location:    []  Traction: [] Cervical       []Lumbar                       [] Prone          []Supine                       []Intermittent   []Continuous Lbs:  [] before manual  [] after manual    []  Ultrasound: []Continuous   [] Pulsed                           []1MHz   []3MHz W/cm2:  Location:    []  Iontophoresis with dexamethasone         Location: [] Take home patch   [] In clinic    []  Ice     []  heat  []  Ice massage  []  Laser   []  Anodyne Position:  Location:    []  Laser with stim  []  Other:  Position:  Location:    []  Vasopneumatic Device Pressure:       [] lo [] med [] hi   Temperature: [] lo [] med [] hi   [] Skin assessment post-treatment:  []intact []redness- no adverse reaction    []redness  adverse reaction:     21 min Therapeutic Exercise:  [] See flow sheet :   Rationale: increase ROM and increase strength to improve the patients ability to perform daily tasks ease     8 min Neuromuscular Re-education:  []  See flow sheet :   Rationale: increase strength and improve coordination  to improve the patients ability to perform functional tasks with improved scapular and posterior chain stability    8 min Manual Therapy:  TPR right levator scap/rhomboid and mid trap, right medial scap border mobs   Rationale: decrease pain, increase tissue extensibility and decrease trigger points to improve ease of ADLs          With   [] TE   [] TA   [] neuro   [] other: Patient Education: [x] Review HEP    [] Progressed/Changed HEP based on:   [] positioning   [] body mechanics   [] transfers   [] heat/ice application    [] other:      Other Objective/Functional Measures: pt with decreased UE paresthesias today, he is TTP through medial scap border. Pt reports improved sleep when lying on left side vs right; unable to notice any change in symptoms with manual cervical traction as pt denies any pain lying supine today     Pain Level (0-10 scale) post treatment: 0    ASSESSMENT/Changes in Function: Pt and therapist agreed to hold DN today due to son's absence to assist with interpretation of pt response. Pt seems to report that his son states he has to do needling today, discussed with patient that the decision is not his son's or therapist's decision, pt agrees that he does think that DN helped. He was willing to perform manual therapy and therex today. Will plan to DN at next visit as pt reports his son will be able to attend. Patient will continue to benefit from skilled PT services to modify and progress therapeutic interventions, address functional mobility deficits, address ROM deficits, address strength deficits, analyze and address soft tissue restrictions, analyze and cue movement patterns and analyze and modify body mechanics/ergonomics to attain remaining goals.      []  See Plan of Care  []  See progress note/recertification  []  See Discharge Summary         Progress towards goals / Updated goals:  Short Term Goals: To be accomplished in 2 weeks:               1. Patient will be independent with HEP in order to improve activity tolerance.  evaluation yesterday re-assess next week 5/23/19  Long Term Goals: To be accomplished in 4 weeks:               1. Patient will be able to improve FOTO score to 66 in order to demonstrate improvements in functional independence.                2. Patient will be able to improve right shoulder flexion to 165 in order to improve ease of ADLs. not met 150 deg with reports of sharp pain through right teres region 5/29/19               3. Patient will be able to improve right shoulder strength to 4+/5 in order to improve ease of ADLs.      PLAN  []  Upgrade activities as tolerated     []  Continue plan of care  []  Update interventions per flow sheet       []  Discharge due to:_  []  Other:_      Phuc Ortiz DPT, SIERRAPT 6/4/2019  11:47 AM    Future Appointments   Date Time Provider Chantelle Jaramillo   6/17/2019 11:30 AM [facility], [address] [place] [address]

## 2019-07-18 NOTE — PROGRESS NOTES
[facility]  [address]  [place], [address]  [phone] fax    Physical Therapy Discharge Summary  Patient name: Marzena Lara Start of Care: 2019   Referral source: TIMOTHY Howard : 1960                Medical Diagnosis: Pain in right shoulder [M25.511]  Payor: SELF PAY / Plan: [facility] SELF PAY / Product Type: Self Pay /  Onset Date:3 weeks ago                Treatment Diagnosis: Right shoulder pain   Prior Hospitalization: see medical history Provider#: [id number]   Medications: Verified on Patient summary List    Comorbidities: none reported    Prior Level of Function: (I) with all ADLs and hobbies. [email or web address] is only here staying with his son for a short period of time. Visits from Start of Care: 4    Missed Visits: 4  Reporting Period : 19 to 19      Summary of Care:  Short Term Goals: To be accomplished in 2 weeks:               1. Patient will be independent with HEP in order to improve activity tolerance.  evaluation yesterday re-assess next week 19  Long Term Goals: To be accomplished in 4 weeks:               1. Patient will be able to improve FOTO score to 66 in order to demonstrate improvements in functional independence.                2. Patient will be able to improve right shoulder flexion to 165 in order to improve ease of ADLs. not met 150 deg with reports of sharp pain through right teres region 19               3. Patient will be able to improve right shoulder strength to 4+/5 in order to improve ease of ADLs.        ASSESSMENT/RECOMMENDATIONS: Unable to further assess progress towards goals at this time due to non-compliance/lack of attendance. Therapist attempted to call patient 3 times, patients number has been disconnected. DC at this time with no further instructions to the patient.   Thank you for this referral.      [x]Discontinue therapy: []Patient has reached or is progressing toward set goals      [x]Patient is non-compliant or has abdicated      []Due to lack of appreciable progress towards set [id number] [facility],[address], PT 7/18/2019 8:49 AM